# Patient Record
Sex: MALE | Race: OTHER
[De-identification: names, ages, dates, MRNs, and addresses within clinical notes are randomized per-mention and may not be internally consistent; named-entity substitution may affect disease eponyms.]

---

## 2020-07-08 ENCOUNTER — HOSPITAL ENCOUNTER (OUTPATIENT)
Dept: HOSPITAL 94 - SSTAY O | Age: 60
Discharge: HOME | End: 2020-07-08
Attending: RADIOLOGY
Payer: MEDICARE

## 2020-07-08 VITALS — SYSTOLIC BLOOD PRESSURE: 149 MMHG | DIASTOLIC BLOOD PRESSURE: 71 MMHG

## 2020-07-08 VITALS — WEIGHT: 141.76 LBS | HEIGHT: 66 IN | BODY MASS INDEX: 22.78 KG/M2

## 2020-07-08 VITALS — DIASTOLIC BLOOD PRESSURE: 84 MMHG | SYSTOLIC BLOOD PRESSURE: 178 MMHG

## 2020-07-08 VITALS — SYSTOLIC BLOOD PRESSURE: 192 MMHG | DIASTOLIC BLOOD PRESSURE: 97 MMHG

## 2020-07-08 VITALS — SYSTOLIC BLOOD PRESSURE: 140 MMHG | DIASTOLIC BLOOD PRESSURE: 68 MMHG

## 2020-07-08 VITALS — DIASTOLIC BLOOD PRESSURE: 117 MMHG | SYSTOLIC BLOOD PRESSURE: 214 MMHG

## 2020-07-08 DIAGNOSIS — I12.9: ICD-10-CM

## 2020-07-08 DIAGNOSIS — Z79.01: ICD-10-CM

## 2020-07-08 DIAGNOSIS — Z11.59: ICD-10-CM

## 2020-07-08 DIAGNOSIS — E11.22: ICD-10-CM

## 2020-07-08 DIAGNOSIS — I25.10: ICD-10-CM
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
## 2020-07-08 DIAGNOSIS — Z79.899: ICD-10-CM

## 2020-07-08 DIAGNOSIS — N18.9: ICD-10-CM

## 2020-07-08 DIAGNOSIS — T82.868A: Primary | ICD-10-CM
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
## 2020-07-08 LAB
BASOPHILS # BLD AUTO: 0.1 X10'3 (ref 0–0.2)
BASOPHILS NFR BLD AUTO: 0.8 % (ref 0–1)
EOSINOPHIL # BLD AUTO: 0.2 X10'3 (ref 0–0.9)
EOSINOPHIL NFR BLD AUTO: 2.7 % (ref 0–6)
ERYTHROCYTE [DISTWIDTH] IN BLOOD BY AUTOMATED COUNT: 16.1 % (ref 11.5–14.5)
HCT VFR BLD AUTO: 39.5 % (ref 42–52)
HGB BLD-MCNC: 13 G/DL (ref 14–17.9)
LYMPHOCYTES # BLD AUTO: 1.3 X10'3 (ref 1.1–4.8)
LYMPHOCYTES NFR BLD AUTO: 15.5 % (ref 21–51)
MCH RBC QN AUTO: 32.1 PG (ref 27–31)
MCHC RBC AUTO-ENTMCNC: 33 G/DL (ref 33–36.5)
MCV RBC AUTO: 97.3 FL (ref 78–98)
MONOCYTES # BLD AUTO: 0.6 X10'3 (ref 0–0.9)
MONOCYTES NFR BLD AUTO: 6.9 % (ref 2–12)
NEUTROPHILS # BLD AUTO: 6 X10'3 (ref 1.8–7.7)
NEUTROPHILS NFR BLD AUTO: 74.1 % (ref 42–75)
PLATELET # BLD AUTO: 205 X10'3 (ref 140–440)
PMV BLD AUTO: 8.3 FL (ref 7.4–10.4)
RBC # BLD AUTO: 4.07 X10'6 (ref 4.7–6.1)
WBC # BLD AUTO: 8.1 X10'3 (ref 4.5–11)

## 2020-07-08 PROCEDURE — 36905 THRMBC/NFS DIALYSIS CIRCUIT: CPT

## 2020-07-08 PROCEDURE — 36415 COLL VENOUS BLD VENIPUNCTURE: CPT

## 2020-07-08 PROCEDURE — 87635 SARS-COV-2 COVID-19 AMP PRB: CPT

## 2020-07-08 PROCEDURE — 99153 MOD SED SAME PHYS/QHP EA: CPT

## 2020-07-08 PROCEDURE — 85025 COMPLETE CBC W/AUTO DIFF WBC: CPT

## 2020-07-08 PROCEDURE — 99152 MOD SED SAME PHYS/QHP 5/>YRS: CPT

## 2020-07-09 ENCOUNTER — HOSPITAL ENCOUNTER (OUTPATIENT)
Dept: HOSPITAL 94 - SSTAY O | Age: 60
Discharge: HOME | End: 2020-07-09
Attending: RADIOLOGY
Payer: MEDICARE

## 2020-07-09 VITALS — SYSTOLIC BLOOD PRESSURE: 206 MMHG | DIASTOLIC BLOOD PRESSURE: 91 MMHG

## 2020-07-09 VITALS — WEIGHT: 140.88 LBS | HEIGHT: 66 IN | BODY MASS INDEX: 22.64 KG/M2

## 2020-07-09 VITALS — DIASTOLIC BLOOD PRESSURE: 88 MMHG | SYSTOLIC BLOOD PRESSURE: 203 MMHG

## 2020-07-09 VITALS — SYSTOLIC BLOOD PRESSURE: 197 MMHG | DIASTOLIC BLOOD PRESSURE: 90 MMHG

## 2020-07-09 VITALS — SYSTOLIC BLOOD PRESSURE: 214 MMHG | DIASTOLIC BLOOD PRESSURE: 101 MMHG

## 2020-07-09 DIAGNOSIS — T82.868A: Primary | ICD-10-CM
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
## 2020-07-09 DIAGNOSIS — N18.9: ICD-10-CM

## 2020-07-09 DIAGNOSIS — E11.22: ICD-10-CM

## 2020-07-09 DIAGNOSIS — Z79.01: ICD-10-CM

## 2020-07-09 DIAGNOSIS — I25.10: ICD-10-CM

## 2020-07-09 DIAGNOSIS — I12.9: ICD-10-CM
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
## 2020-07-09 DIAGNOSIS — Z79.899: ICD-10-CM

## 2020-07-09 PROCEDURE — 99152 MOD SED SAME PHYS/QHP 5/>YRS: CPT

## 2020-07-09 PROCEDURE — 76937 US GUIDE VASCULAR ACCESS: CPT

## 2020-07-09 PROCEDURE — 36558 INSERT TUNNELED CV CATH: CPT

## 2020-07-09 PROCEDURE — 99153 MOD SED SAME PHYS/QHP EA: CPT

## 2020-07-09 PROCEDURE — 77001 FLUOROGUIDE FOR VEIN DEVICE: CPT

## 2020-07-10 ENCOUNTER — HOSPITAL ENCOUNTER (EMERGENCY)
Dept: HOSPITAL 94 - ER | Age: 60
Discharge: HOME | End: 2020-07-10
Payer: MEDICARE

## 2020-07-10 VITALS — SYSTOLIC BLOOD PRESSURE: 169 MMHG | DIASTOLIC BLOOD PRESSURE: 84 MMHG

## 2020-07-10 VITALS — HEIGHT: 66 IN | BODY MASS INDEX: 21.97 KG/M2 | WEIGHT: 136.69 LBS
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
## 2020-07-10 DIAGNOSIS — Z79.82: ICD-10-CM

## 2020-07-10 DIAGNOSIS — T82.838A: Primary | ICD-10-CM

## 2020-07-10 DIAGNOSIS — Z79.899: ICD-10-CM

## 2020-07-10 DIAGNOSIS — Z88.8: ICD-10-CM
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
## 2020-07-10 DIAGNOSIS — Z79.4: ICD-10-CM

## 2020-07-10 PROCEDURE — 99282 EMERGENCY DEPT VISIT SF MDM: CPT

## 2020-07-10 PROCEDURE — 99281 EMR DPT VST MAYX REQ PHY/QHP: CPT

## 2020-07-11 ENCOUNTER — HOSPITAL ENCOUNTER (EMERGENCY)
Dept: HOSPITAL 94 - ER | Age: 60
Discharge: HOME | End: 2020-07-11
Payer: MEDICARE

## 2020-07-11 VITALS — DIASTOLIC BLOOD PRESSURE: 93 MMHG | SYSTOLIC BLOOD PRESSURE: 207 MMHG

## 2020-07-11 VITALS — HEIGHT: 66 IN | BODY MASS INDEX: 22.55 KG/M2 | WEIGHT: 140.3 LBS

## 2020-07-11 DIAGNOSIS — Z48.00: ICD-10-CM

## 2020-07-11 DIAGNOSIS — Z88.8: ICD-10-CM

## 2020-07-11 DIAGNOSIS — N18.6: ICD-10-CM

## 2020-07-11 DIAGNOSIS — Z79.899: ICD-10-CM

## 2020-07-11 DIAGNOSIS — I12.0: ICD-10-CM

## 2020-07-11 DIAGNOSIS — T82.898A: Primary | ICD-10-CM

## 2020-07-11 PROCEDURE — 99282 EMERGENCY DEPT VISIT SF MDM: CPT

## 2020-07-11 PROCEDURE — 99281 EMR DPT VST MAYX REQ PHY/QHP: CPT

## 2020-08-03 ENCOUNTER — HOSPITAL ENCOUNTER (OUTPATIENT)
Dept: HOSPITAL 94 - SSTAY O | Age: 60
Discharge: HOME | End: 2020-08-03
Attending: RADIOLOGY
Payer: MEDICARE

## 2020-08-03 VITALS — HEIGHT: 66 IN | WEIGHT: 141.32 LBS | BODY MASS INDEX: 22.71 KG/M2

## 2020-08-03 VITALS — SYSTOLIC BLOOD PRESSURE: 219 MMHG | DIASTOLIC BLOOD PRESSURE: 96 MMHG

## 2020-08-03 DIAGNOSIS — Z53.8: ICD-10-CM

## 2020-08-03 DIAGNOSIS — Z79.01: ICD-10-CM
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
## 2020-08-03 DIAGNOSIS — Z88.8: ICD-10-CM
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
## 2020-08-03 DIAGNOSIS — Z79.899: ICD-10-CM

## 2020-08-03 DIAGNOSIS — T82.868A: Primary | ICD-10-CM

## 2020-08-03 LAB
ALBUMIN SERPL BCP-MCNC: 3.4 G/DL (ref 3.4–5)
ANION GAP SERPL CALCULATED.3IONS-SCNC: 16 MMOL/L (ref 8–16)
BASOPHILS # BLD AUTO: 0.1 X10'3 (ref 0–0.2)
BASOPHILS NFR BLD AUTO: 0.8 % (ref 0–1)
BUN SERPL-MCNC: 79 MG/DL (ref 7–18)
BUN/CREAT SERPL: 9.6 (ref 5.4–32)
CALCIUM SERPL-MCNC: 7.7 MG/DL (ref 8.5–10.1)
CHLORIDE SERPL-SCNC: 101 MMOL/L (ref 99–107)
CO2 SERPL-SCNC: 23.3 MMOL/L (ref 24–32)
CREAT SERPL-MCNC: 8.23 MG/DL (ref 0.6–1.1)
EOSINOPHIL # BLD AUTO: 0.5 X10'3 (ref 0–0.9)
EOSINOPHIL NFR BLD AUTO: 5.3 % (ref 0–6)
ERYTHROCYTE [DISTWIDTH] IN BLOOD BY AUTOMATED COUNT: 15.1 % (ref 11.5–14.5)
GFR SERPL CREATININE-BSD FRML MDRD: 7 ML/MIN
GLUCOSE SERPL-MCNC: 120 MG/DL (ref 70–104)
HCT VFR BLD AUTO: 36.2 % (ref 42–52)
HGB BLD-MCNC: 12.1 G/DL (ref 14–17.9)
LYMPHOCYTES # BLD AUTO: 2.1 X10'3 (ref 1.1–4.8)
LYMPHOCYTES NFR BLD AUTO: 21 % (ref 21–51)
MCH RBC QN AUTO: 31.5 PG (ref 27–31)
MCHC RBC AUTO-ENTMCNC: 33.3 G/DL (ref 33–36.5)
MCV RBC AUTO: 94.6 FL (ref 78–98)
MONOCYTES # BLD AUTO: 0.6 X10'3 (ref 0–0.9)
MONOCYTES NFR BLD AUTO: 5.8 % (ref 2–12)
NEUTROPHILS # BLD AUTO: 6.7 X10'3 (ref 1.8–7.7)
NEUTROPHILS NFR BLD AUTO: 67.1 % (ref 42–75)
PLATELET # BLD AUTO: 267 X10'3 (ref 140–440)
PMV BLD AUTO: 8.2 FL (ref 7.4–10.4)
POTASSIUM SERPL-SCNC: 4.2 MMOL/L (ref 3.5–5.1)
RBC # BLD AUTO: 3.82 X10'6 (ref 4.7–6.1)
SODIUM SERPL-SCNC: 140 MMOL/L (ref 135–145)
WBC # BLD AUTO: 10 X10'3 (ref 4.5–11)

## 2020-08-03 PROCEDURE — 36415 COLL VENOUS BLD VENIPUNCTURE: CPT

## 2020-08-03 PROCEDURE — 85025 COMPLETE CBC W/AUTO DIFF WBC: CPT

## 2020-08-03 PROCEDURE — 80048 BASIC METABOLIC PNL TOTAL CA: CPT

## 2020-08-03 PROCEDURE — 85610 PROTHROMBIN TIME: CPT

## 2020-08-03 NOTE — NUR
pt procedure cancelled. pt sitting in room and eating a sandwich, waiting for daughter to 
arrive for ride. Daughter was informed and is on her way.

## 2020-10-13 ENCOUNTER — HOSPITAL ENCOUNTER (OUTPATIENT)
Dept: HOSPITAL 94 - VAS | Age: 60
Discharge: HOME | End: 2020-10-13
Attending: INTERNAL MEDICINE
Payer: MEDICARE

## 2020-10-13 DIAGNOSIS — I65.23: Primary | ICD-10-CM

## 2020-10-13 PROCEDURE — 93880 EXTRACRANIAL BILAT STUDY: CPT
